# Patient Record
Sex: MALE | Race: WHITE | NOT HISPANIC OR LATINO
[De-identification: names, ages, dates, MRNs, and addresses within clinical notes are randomized per-mention and may not be internally consistent; named-entity substitution may affect disease eponyms.]

---

## 2019-06-18 ENCOUNTER — APPOINTMENT (OUTPATIENT)
Dept: ORTHOPEDIC SURGERY | Facility: CLINIC | Age: 54
End: 2019-06-18
Payer: MEDICARE

## 2019-06-18 VITALS — SYSTOLIC BLOOD PRESSURE: 130 MMHG | DIASTOLIC BLOOD PRESSURE: 78 MMHG | HEART RATE: 97 BPM

## 2019-06-18 DIAGNOSIS — Z60.2 PROBLEMS RELATED TO LIVING ALONE: ICD-10-CM

## 2019-06-18 DIAGNOSIS — Z78.9 OTHER SPECIFIED HEALTH STATUS: ICD-10-CM

## 2019-06-18 PROCEDURE — 99203 OFFICE O/P NEW LOW 30 MIN: CPT

## 2019-06-18 PROCEDURE — 73030 X-RAY EXAM OF SHOULDER: CPT | Mod: LT

## 2019-06-18 SDOH — SOCIAL STABILITY - SOCIAL INSECURITY: PROBLEMS RELATED TO LIVING ALONE: Z60.2

## 2019-08-23 ENCOUNTER — APPOINTMENT (OUTPATIENT)
Dept: ORTHOPEDIC SURGERY | Facility: CLINIC | Age: 54
End: 2019-08-23
Payer: MEDICARE

## 2019-08-23 VITALS — HEART RATE: 76 BPM | DIASTOLIC BLOOD PRESSURE: 75 MMHG | SYSTOLIC BLOOD PRESSURE: 114 MMHG

## 2019-08-23 PROCEDURE — 20610 DRAIN/INJ JOINT/BURSA W/O US: CPT | Mod: LT

## 2019-08-23 PROCEDURE — 99214 OFFICE O/P EST MOD 30 MIN: CPT | Mod: 25

## 2019-09-03 ENCOUNTER — APPOINTMENT (OUTPATIENT)
Dept: ORTHOPEDIC SURGERY | Facility: CLINIC | Age: 54
End: 2019-09-03
Payer: MEDICARE

## 2019-09-03 VITALS
DIASTOLIC BLOOD PRESSURE: 80 MMHG | HEIGHT: 73 IN | BODY MASS INDEX: 25.1 KG/M2 | WEIGHT: 189.4 LBS | HEART RATE: 69 BPM | SYSTOLIC BLOOD PRESSURE: 116 MMHG

## 2019-09-03 DIAGNOSIS — R29.898 OTHER SYMPTOMS AND SIGNS INVOLVING THE MUSCULOSKELETAL SYSTEM: ICD-10-CM

## 2019-09-03 PROCEDURE — 99214 OFFICE O/P EST MOD 30 MIN: CPT

## 2019-10-18 ENCOUNTER — APPOINTMENT (OUTPATIENT)
Dept: ORTHOPEDIC SURGERY | Facility: CLINIC | Age: 54
End: 2019-10-18
Payer: MEDICARE

## 2019-10-18 VITALS
HEART RATE: 91 BPM | DIASTOLIC BLOOD PRESSURE: 79 MMHG | HEIGHT: 73 IN | SYSTOLIC BLOOD PRESSURE: 116 MMHG | WEIGHT: 189 LBS | BODY MASS INDEX: 25.05 KG/M2

## 2019-10-18 PROCEDURE — 99213 OFFICE O/P EST LOW 20 MIN: CPT

## 2019-11-13 ENCOUNTER — OUTPATIENT (OUTPATIENT)
Dept: OUTPATIENT SERVICES | Facility: HOSPITAL | Age: 54
LOS: 1 days | End: 2019-11-13
Payer: MEDICARE

## 2019-11-13 VITALS
HEIGHT: 73 IN | RESPIRATION RATE: 14 BRPM | HEART RATE: 92 BPM | WEIGHT: 190.04 LBS | DIASTOLIC BLOOD PRESSURE: 92 MMHG | OXYGEN SATURATION: 97 % | SYSTOLIC BLOOD PRESSURE: 123 MMHG | TEMPERATURE: 98 F

## 2019-11-13 DIAGNOSIS — Z98.890 OTHER SPECIFIED POSTPROCEDURAL STATES: Chronic | ICD-10-CM

## 2019-11-13 DIAGNOSIS — Z01.818 ENCOUNTER FOR OTHER PREPROCEDURAL EXAMINATION: ICD-10-CM

## 2019-11-13 DIAGNOSIS — Z90.89 ACQUIRED ABSENCE OF OTHER ORGANS: Chronic | ICD-10-CM

## 2019-11-13 DIAGNOSIS — S43.432A SUPERIOR GLENOID LABRUM LESION OF LEFT SHOULDER, INITIAL ENCOUNTER: ICD-10-CM

## 2019-11-13 DIAGNOSIS — M67.912 UNSPECIFIED DISORDER OF SYNOVIUM AND TENDON, LEFT SHOULDER: ICD-10-CM

## 2019-11-13 DIAGNOSIS — Z98.1 ARTHRODESIS STATUS: Chronic | ICD-10-CM

## 2019-11-13 LAB
ANION GAP SERPL CALC-SCNC: 7 MMOL/L — SIGNIFICANT CHANGE UP (ref 5–17)
APTT BLD: 28.9 SEC — SIGNIFICANT CHANGE UP (ref 27.5–36.3)
BASOPHILS # BLD AUTO: 0.03 K/UL — SIGNIFICANT CHANGE UP (ref 0–0.2)
BASOPHILS NFR BLD AUTO: 0.5 % — SIGNIFICANT CHANGE UP (ref 0–2)
BUN SERPL-MCNC: 31 MG/DL — HIGH (ref 7–23)
CALCIUM SERPL-MCNC: 9.5 MG/DL — SIGNIFICANT CHANGE UP (ref 8.5–10.1)
CHLORIDE SERPL-SCNC: 104 MMOL/L — SIGNIFICANT CHANGE UP (ref 96–108)
CO2 SERPL-SCNC: 27 MMOL/L — SIGNIFICANT CHANGE UP (ref 22–31)
CREAT SERPL-MCNC: 0.75 MG/DL — SIGNIFICANT CHANGE UP (ref 0.5–1.3)
EOSINOPHIL # BLD AUTO: 0.07 K/UL — SIGNIFICANT CHANGE UP (ref 0–0.5)
EOSINOPHIL NFR BLD AUTO: 1.1 % — SIGNIFICANT CHANGE UP (ref 0–6)
GLUCOSE SERPL-MCNC: 105 MG/DL — HIGH (ref 70–99)
HCT VFR BLD CALC: 47.5 % — SIGNIFICANT CHANGE UP (ref 39–50)
HGB BLD-MCNC: 15.9 G/DL — SIGNIFICANT CHANGE UP (ref 13–17)
IMM GRANULOCYTES NFR BLD AUTO: 0.3 % — SIGNIFICANT CHANGE UP (ref 0–1.5)
INR BLD: 1.08 RATIO — SIGNIFICANT CHANGE UP (ref 0.88–1.16)
LYMPHOCYTES # BLD AUTO: 1.24 K/UL — SIGNIFICANT CHANGE UP (ref 1–3.3)
LYMPHOCYTES # BLD AUTO: 19.6 % — SIGNIFICANT CHANGE UP (ref 13–44)
MCHC RBC-ENTMCNC: 29.6 PG — SIGNIFICANT CHANGE UP (ref 27–34)
MCHC RBC-ENTMCNC: 33.5 GM/DL — SIGNIFICANT CHANGE UP (ref 32–36)
MCV RBC AUTO: 88.5 FL — SIGNIFICANT CHANGE UP (ref 80–100)
MONOCYTES # BLD AUTO: 0.56 K/UL — SIGNIFICANT CHANGE UP (ref 0–0.9)
MONOCYTES NFR BLD AUTO: 8.8 % — SIGNIFICANT CHANGE UP (ref 2–14)
NEUTROPHILS # BLD AUTO: 4.42 K/UL — SIGNIFICANT CHANGE UP (ref 1.8–7.4)
NEUTROPHILS NFR BLD AUTO: 69.7 % — SIGNIFICANT CHANGE UP (ref 43–77)
PLATELET # BLD AUTO: 222 K/UL — SIGNIFICANT CHANGE UP (ref 150–400)
POTASSIUM SERPL-MCNC: 4.3 MMOL/L — SIGNIFICANT CHANGE UP (ref 3.5–5.3)
POTASSIUM SERPL-SCNC: 4.3 MMOL/L — SIGNIFICANT CHANGE UP (ref 3.5–5.3)
PROTHROM AB SERPL-ACNC: 12 SEC — SIGNIFICANT CHANGE UP (ref 10–12.9)
RBC # BLD: 5.37 M/UL — SIGNIFICANT CHANGE UP (ref 4.2–5.8)
RBC # FLD: 13.2 % — SIGNIFICANT CHANGE UP (ref 10.3–14.5)
SODIUM SERPL-SCNC: 138 MMOL/L — SIGNIFICANT CHANGE UP (ref 135–145)
WBC # BLD: 6.34 K/UL — SIGNIFICANT CHANGE UP (ref 3.8–10.5)
WBC # FLD AUTO: 6.34 K/UL — SIGNIFICANT CHANGE UP (ref 3.8–10.5)

## 2019-11-13 PROCEDURE — 36415 COLL VENOUS BLD VENIPUNCTURE: CPT

## 2019-11-13 PROCEDURE — 85610 PROTHROMBIN TIME: CPT

## 2019-11-13 PROCEDURE — 80048 BASIC METABOLIC PNL TOTAL CA: CPT

## 2019-11-13 PROCEDURE — 85025 COMPLETE CBC W/AUTO DIFF WBC: CPT

## 2019-11-13 PROCEDURE — 93005 ELECTROCARDIOGRAM TRACING: CPT

## 2019-11-13 PROCEDURE — 85730 THROMBOPLASTIN TIME PARTIAL: CPT

## 2019-11-13 PROCEDURE — G0463: CPT | Mod: 25

## 2019-11-13 PROCEDURE — 93010 ELECTROCARDIOGRAM REPORT: CPT
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
## 2019-11-13 NOTE — H&P PST ADULT - NSANTHOSAYNRD_GEN_A_CORE
No. GIO screening performed.  STOP BANG Legend: 0-2 = LOW Risk; 3-4 = INTERMEDIATE Risk; 5-8 = HIGH Risk

## 2019-11-13 NOTE — H&P PST ADULT - ASSESSMENT
yo scheduled for   with      1. Labs as per surgeon  2. EKG  3. Medical evaluation with  4. discussed EZ sponges, NPO after MN & PST day of procedure instructions  5. Instructed to increase po fluids the day before surgery. Instructed to hold aspirin, NSAIDs, fish oil, vitamins & herbal supplements 7 days prior to surgery  6. CXR 55 yo male scheduled for left shoulder arthroscopy, lysis of adhesions, biceps tenodesis & related procedures on 11/20/19   with Dr. Rodríguez    1. CBC w diff, BMP, PTT, PT/INR  2. EKG  3. Medical evaluation with PCP Dr Faisal Mantilla as per NP in PST d/t abnormal EKG & h/o spinal cord injury. Pt instructed to make appt. Dr Rodríguez's office will be notified of need for clearance.   4. discussed EZ sponges, NPO after MN & PST day of procedure instructions  5. Instructed to increase po fluids the day before surgery. Instructed to hold aspirin, NSAIDs, fish oil, vitamins & herbal supplements 7 days prior to surgery
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
## 2019-11-14 DIAGNOSIS — M67.912 UNSPECIFIED DISORDER OF SYNOVIUM AND TENDON, LEFT SHOULDER: ICD-10-CM

## 2019-11-14 DIAGNOSIS — Z01.818 ENCOUNTER FOR OTHER PREPROCEDURAL EXAMINATION: ICD-10-CM

## 2019-11-14 DIAGNOSIS — S43.432A SUPERIOR GLENOID LABRUM LESION OF LEFT SHOULDER, INITIAL ENCOUNTER: ICD-10-CM

## 2019-11-18 ENCOUNTER — RX RENEWAL (OUTPATIENT)
Age: 54
End: 2019-11-18

## 2019-11-18 RX ORDER — ONDANSETRON 4 MG/1
4 TABLET ORAL
Qty: 42 | Refills: 0 | Status: COMPLETED | COMMUNITY
Start: 2019-11-18 | End: 2019-11-25

## 2019-11-18 RX ORDER — OXYCODONE 5 MG/1
5 TABLET ORAL
Qty: 30 | Refills: 0 | Status: DISCONTINUED | COMMUNITY
Start: 2019-11-18 | End: 2019-11-18

## 2019-11-19 RX ORDER — FENTANYL CITRATE 50 UG/ML
50 INJECTION INTRAVENOUS
Refills: 0 | Status: DISCONTINUED | OUTPATIENT
Start: 2019-11-20 | End: 2019-11-20

## 2019-11-19 RX ORDER — SODIUM CHLORIDE 9 MG/ML
1000 INJECTION, SOLUTION INTRAVENOUS
Refills: 0 | Status: DISCONTINUED | OUTPATIENT
Start: 2019-11-20 | End: 2019-11-20

## 2019-11-19 RX ORDER — OXYCODONE HYDROCHLORIDE 5 MG/1
10 TABLET ORAL ONCE
Refills: 0 | Status: DISCONTINUED | OUTPATIENT
Start: 2019-11-20 | End: 2019-11-20

## 2019-11-19 RX ORDER — ONDANSETRON 8 MG/1
4 TABLET, FILM COATED ORAL ONCE
Refills: 0 | Status: DISCONTINUED | OUTPATIENT
Start: 2019-11-20 | End: 2019-11-20

## 2019-11-19 NOTE — H&P ADULT - NSICDXPASTMEDICALHX_GEN_ALL_CORE_FT
PAST MEDICAL HISTORY:  Brown-Sequard syndrome     Impaired gait     Left shoulder pain     Paresthesia right side of body    Tear of right rotator cuff left

## 2019-11-19 NOTE — H&P ADULT - PROBLEM SELECTOR PLAN 1
Left shoulder arthroscopy with ZACKARY, HERLINDA< extensive debridement, SAD and acromioplasty, possible rotator cuff repair

## 2019-11-19 NOTE — H&P ADULT - HISTORY OF PRESENT ILLNESS
Mr. BONDS is a pleasant 54 year old male who presents with Left shoulder pain. He still complains of decreased range of motion and pain in his shoulder. He has difficulty using his arm when in the wheelchair. Overall he is very unhappy with the way shoulder feels.       ANI BONDS presents with pain of the left shoulder. He states the symptoms are stable. Pain levels include a current pain level of 0/10, an average pain level of 0/10, a minimum pain level of 0/10 and a maximum pain level of 0/10.   He states the symptoms seem to be intermittent.     Modifying factors - worsened by lifting.

## 2019-11-19 NOTE — H&P ADULT - NSHPPHYSICALEXAM_GEN_ALL_CORE
Physical Exam:  General: Well appearing, no acute distress, A&O  Neurologic: A&Ox3, No focal deficits  Head: NCAT without abrasions, lacerations, or ecchymosis to head, face, or scalp  Eyes: No scleral icterus, no gross abnormalities  Respiratory: Equal chest wall expansion bilaterally, no accessory muscle use  Lymphatic: No lymphadenopathy palpated  Skin: Warm and dry  Psychiatric: Normal mood and affect  Left Shoulder  ·	Inspection/Palpation: no tenderness, swelling or deformities  ·	Range of Motion: no crepitus with ROM; Active ; ER at side 10; IR to belt; Passive ; ER at side 15; IR to belt  ·	Strength: forward elevation in scapular plane [4/5], internal rotation [4/5], external rotation [4/5], adduction [4/5] and abduction [4/5]  ·	Stability: no joint instability on provocative testing  ·	Tests: Mendez test positive, Neer positive, positive drop arm test secondary to pain, bear hug test positive, Napolean sign negative, cross arm adduction negative, lift off sign positive, hornblowers sign negative, speeds test negative, Yergason's test negative, no bicipital groove tenderness, Diaz's Active Compression test negative, whipple test negative, bicep's load II test negative    Right Shoulder  ·	Inspection/Palpation: no tenderness, swelling or deformities  ·	Range of Motion: full and painless in all planes, no crepitus  ·	Strength: forward elevation in scapular plane 5/5, internal rotation 5/5, external rotation 5/5, adduction 5/5 and abduction 5/5  ·	Stability: no joint instability on provocative testing  ·	Tests: Mendez test negative, Neer sign negative, negative drop arm test secondary to pain, bear hug test negative, Napolean sign negative, cross arm adduction negative, lift off sign positive, hornblowers sign negative, speeds test negative, Yergason's test negative, no bicipital groove tenderness, Diaz's Active Compression test negative

## 2019-11-19 NOTE — H&P ADULT - ASSESSMENT
Bong is a 54-year-old male comes in for followup of his left shoulder pain. He saw significant restricted range of motion and difficulty using his arm. Then a thorough discussion with him regarding operative and nonoperative management. Given that he has failed all conservative measures he elects for operative intervention. He defers all other conservative management. He would like to move forward with arthroscopic surgery of his left shoulder.    All risks, benefits and alternatives to the proposed surgical procedure, left shoulder arthroscopy with lysis of adhesions, manipulation under anesthesia, extensive debridement, rotator cuff debridement, biceps tenodesis versus tenotomy, subacromial decompression with acromioplasty, possible distal clavicle excision, were discussed in great detail with the patient. Risks include but are not limited to pain, bleeding, infection, stiffness, , medical complications (including DVT, PE, MI), and risks of anesthesia. The patient expressed understanding and all questions were answered. The patient is electing to proceed, and will have the patient scheduled accordingly.

## 2019-11-20 ENCOUNTER — INPATIENT (INPATIENT)
Facility: HOSPITAL | Age: 54
LOS: 4 days | Discharge: HOME | DRG: 501 | End: 2019-11-25
Attending: ORTHOPAEDIC SURGERY | Admitting: ORTHOPAEDIC SURGERY
Payer: MEDICARE

## 2019-11-20 ENCOUNTER — APPOINTMENT (OUTPATIENT)
Dept: ORTHOPEDIC SURGERY | Facility: HOSPITAL | Age: 54
End: 2019-11-20

## 2019-11-20 VITALS
HEIGHT: 73 IN | TEMPERATURE: 98 F | OXYGEN SATURATION: 96 % | HEART RATE: 84 BPM | SYSTOLIC BLOOD PRESSURE: 130 MMHG | WEIGHT: 190.04 LBS | RESPIRATION RATE: 16 BRPM | DIASTOLIC BLOOD PRESSURE: 84 MMHG

## 2019-11-20 DIAGNOSIS — M75.02 ADHESIVE CAPSULITIS OF LEFT SHOULDER: ICD-10-CM

## 2019-11-20 DIAGNOSIS — S43.432A SUPERIOR GLENOID LABRUM LESION OF LEFT SHOULDER, INITIAL ENCOUNTER: ICD-10-CM

## 2019-11-20 DIAGNOSIS — Z98.890 OTHER SPECIFIED POSTPROCEDURAL STATES: Chronic | ICD-10-CM

## 2019-11-20 DIAGNOSIS — M67.912 UNSPECIFIED DISORDER OF SYNOVIUM AND TENDON, LEFT SHOULDER: ICD-10-CM

## 2019-11-20 DIAGNOSIS — Z98.1 ARTHRODESIS STATUS: Chronic | ICD-10-CM

## 2019-11-20 DIAGNOSIS — Z90.89 ACQUIRED ABSENCE OF OTHER ORGANS: Chronic | ICD-10-CM

## 2019-11-20 RX ORDER — OXYCODONE HYDROCHLORIDE 5 MG/1
10 TABLET ORAL EVERY 4 HOURS
Refills: 0 | Status: DISCONTINUED | OUTPATIENT
Start: 2019-11-20 | End: 2019-11-25

## 2019-11-20 RX ORDER — HYDROMORPHONE HYDROCHLORIDE 2 MG/ML
0.5 INJECTION INTRAMUSCULAR; INTRAVENOUS; SUBCUTANEOUS
Refills: 0 | Status: DISCONTINUED | OUTPATIENT
Start: 2019-11-20 | End: 2019-11-22

## 2019-11-20 RX ORDER — DIAZEPAM 5 MG
10 TABLET ORAL EVERY 8 HOURS
Refills: 0 | Status: DISCONTINUED | OUTPATIENT
Start: 2019-11-20 | End: 2019-11-25

## 2019-11-20 RX ORDER — ZOLPIDEM TARTRATE 10 MG/1
5 TABLET ORAL AT BEDTIME
Refills: 0 | Status: DISCONTINUED | OUTPATIENT
Start: 2019-11-20 | End: 2019-11-25

## 2019-11-20 RX ORDER — ZOLPIDEM TARTRATE 10 MG/1
1 TABLET ORAL
Qty: 0 | Refills: 0 | DISCHARGE

## 2019-11-20 RX ORDER — DIAZEPAM 5 MG
1 TABLET ORAL
Qty: 0 | Refills: 0 | DISCHARGE

## 2019-11-20 RX ORDER — CEFAZOLIN SODIUM 1 G
2000 VIAL (EA) INJECTION EVERY 8 HOURS
Refills: 0 | Status: COMPLETED | OUTPATIENT
Start: 2019-11-20 | End: 2019-11-21

## 2019-11-20 RX ORDER — BACLOFEN 100 %
20 POWDER (GRAM) MISCELLANEOUS THREE TIMES A DAY
Refills: 0 | Status: DISCONTINUED | OUTPATIENT
Start: 2019-11-20 | End: 2019-11-25

## 2019-11-20 RX ORDER — BACLOFEN 100 %
1 POWDER (GRAM) MISCELLANEOUS
Qty: 0 | Refills: 0 | DISCHARGE

## 2019-11-20 RX ORDER — OXYCODONE HYDROCHLORIDE 5 MG/1
5 TABLET ORAL EVERY 4 HOURS
Refills: 0 | Status: DISCONTINUED | OUTPATIENT
Start: 2019-11-20 | End: 2019-11-25

## 2019-11-20 RX ORDER — IBUPROFEN 200 MG
1 TABLET ORAL
Qty: 0 | Refills: 0 | DISCHARGE

## 2019-11-20 RX ADMIN — OXYCODONE HYDROCHLORIDE 10 MILLIGRAM(S): 5 TABLET ORAL at 17:33

## 2019-11-20 RX ADMIN — OXYCODONE HYDROCHLORIDE 10 MILLIGRAM(S): 5 TABLET ORAL at 22:10

## 2019-11-20 RX ADMIN — Medication 100 MILLIGRAM(S): at 23:18

## 2019-11-20 RX ADMIN — OXYCODONE HYDROCHLORIDE 10 MILLIGRAM(S): 5 TABLET ORAL at 21:40

## 2019-11-20 NOTE — ASU PATIENT PROFILE, ADULT - PMH
Brown-Sequard syndrome    Impaired gait    Left shoulder pain    Paresthesia  right side of body  Tear of right rotator cuff  left

## 2019-11-20 NOTE — PROGRESS NOTE ADULT - SUBJECTIVE AND OBJECTIVE BOX
Postop Check    Patient tolerated the procedure well. Patient seen and examined at bedside. No acute complaints at this time. Pain well controlled. Denies chest pain, shortness of breath, nausea or vomiting.     PE:  Vital Signs Last 24 Hrs  T(C): 36.3 (11-20-19 @ 22:21), Max: 36.9 (11-20-19 @ 11:22)  T(F): 97.4 (11-20-19 @ 22:21), Max: 98.5 (11-20-19 @ 11:22)  HR: 70 (11-20-19 @ 22:21) (68 - 95)  BP: 119/74 (11-20-19 @ 22:21) (106/84 - 150/83)  BP(mean): --  RR: 16 (11-20-19 @ 22:21) (12 - 17)  SpO2: 96% (11-20-19 @ 22:21) (93% - 100%)    General: NAD, resting comfortably in bed  LUE:   Dressing C/D/I in shoulder immoblizer  Compartments soft and compressible  No calf tenderness bilaterally  +Axillary/Musculocutaneous/Radial/Median/AIN/PIN intact  SILT C5-T1  2+ radial pulses                A/P:  54y m s/p L shoulder scope POD 0  -PT/OT -NWB LUE  -Pain Control  -DVT ppx w/ SCDs  -Continue perioperative abx x 24 hours  -FU AM Labs  -Rest, ice, and compress the extremity as we needed  -Incentive Spirometry  -Medical management appreciated

## 2019-11-20 NOTE — BRIEF OPERATIVE NOTE - NSICDXBRIEFPROCEDURE_GEN_ALL_CORE_FT
PROCEDURES:  Manipulation of shoulder joint under anesthesia 20-Nov-2019 13:53:10  Daryl Rodríguez  Arthroscopic debridement, shoulder, extensive 20-Nov-2019 13:53:03  Daryl Rodríguez  Lysis of adhesions of shoulder joint 20-Nov-2019 13:52:57  Daryl Rodríguez

## 2019-11-20 NOTE — ASU DISCHARGE PLAN (ADULT/PEDIATRIC) - CARE PROVIDER_API CALL
Daryl Rodríguez (DO)  [facility]  [address]  [place] [address]  Phone: [phone]  Fax: [phone]  Follow Up Time:
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
## 2019-11-21 PROCEDURE — 97530 THERAPEUTIC ACTIVITIES: CPT | Mod: GP

## 2019-11-21 PROCEDURE — 97116 GAIT TRAINING THERAPY: CPT | Mod: GP

## 2019-11-21 RX ORDER — TAMSULOSIN HYDROCHLORIDE 0.4 MG/1
0.4 CAPSULE ORAL ONCE
Refills: 0 | Status: COMPLETED | OUTPATIENT
Start: 2019-11-21 | End: 2019-11-21

## 2019-11-21 RX ORDER — TAMSULOSIN HYDROCHLORIDE 0.4 MG/1
0.4 CAPSULE ORAL AT BEDTIME
Refills: 0 | Status: DISCONTINUED | OUTPATIENT
Start: 2019-11-22 | End: 2019-11-25

## 2019-11-21 RX ORDER — TAMSULOSIN HYDROCHLORIDE 0.4 MG/1
0.4 CAPSULE ORAL AT BEDTIME
Refills: 0 | Status: DISCONTINUED | OUTPATIENT
Start: 2019-11-21 | End: 2019-11-21

## 2019-11-21 RX ADMIN — TAMSULOSIN HYDROCHLORIDE 0.4 MILLIGRAM(S): 0.4 CAPSULE ORAL at 14:26

## 2019-11-21 RX ADMIN — OXYCODONE HYDROCHLORIDE 10 MILLIGRAM(S): 5 TABLET ORAL at 16:56

## 2019-11-21 RX ADMIN — HYDROMORPHONE HYDROCHLORIDE 0.5 MILLIGRAM(S): 2 INJECTION INTRAMUSCULAR; INTRAVENOUS; SUBCUTANEOUS at 02:41

## 2019-11-21 RX ADMIN — ZOLPIDEM TARTRATE 5 MILLIGRAM(S): 10 TABLET ORAL at 00:30

## 2019-11-21 RX ADMIN — OXYCODONE HYDROCHLORIDE 10 MILLIGRAM(S): 5 TABLET ORAL at 17:26

## 2019-11-21 RX ADMIN — Medication 100 MILLIGRAM(S): at 05:40

## 2019-11-21 RX ADMIN — HYDROMORPHONE HYDROCHLORIDE 0.5 MILLIGRAM(S): 2 INJECTION INTRAMUSCULAR; INTRAVENOUS; SUBCUTANEOUS at 21:04

## 2019-11-21 RX ADMIN — HYDROMORPHONE HYDROCHLORIDE 0.5 MILLIGRAM(S): 2 INJECTION INTRAMUSCULAR; INTRAVENOUS; SUBCUTANEOUS at 21:19

## 2019-11-21 RX ADMIN — HYDROMORPHONE HYDROCHLORIDE 0.5 MILLIGRAM(S): 2 INJECTION INTRAMUSCULAR; INTRAVENOUS; SUBCUTANEOUS at 02:26

## 2019-11-21 RX ADMIN — OXYCODONE HYDROCHLORIDE 10 MILLIGRAM(S): 5 TABLET ORAL at 10:19

## 2019-11-21 RX ADMIN — Medication 10 MILLIGRAM(S): at 00:30

## 2019-11-21 RX ADMIN — OXYCODONE HYDROCHLORIDE 10 MILLIGRAM(S): 5 TABLET ORAL at 10:49

## 2019-11-21 NOTE — PHYSICAL THERAPY INITIAL EVALUATION ADULT - MODALITIES TREATMENT COMMENTS
chari tx fair, ambulation limited by pain in L shoulder/ left in chair, all needs in reach, Patient would benefit from PT to increase functional mobility

## 2019-11-21 NOTE — PHYSICAL THERAPY INITIAL EVALUATION ADULT - HEALTH SCREEN CRITERIA
Physical Therapy Daily Treatment    Visit Count: 7      Plan of Care: 12/7/2018 Through: 3/1/2019  Insurance Information: physical and speech therapy combined cap of $2010 per calendar year  Referred by: MANGO Wagner; Next provider visit (if known/scheduled): none  Medical Diagnosis (from order):  Chronic right shoulder pain [M25.511, G89.29]  Treatment Diagnosis: Right shoulder symptoms with increased pain/symptoms, impaired posture, impaired strength, impaired range of motion, impaired scapulohumeral rhythm, impaired muscle length/flexibility, impaired mobility     Date of onset/injury: 1 month ago  Diagnosis Precautions: none  Chart reviewed at time of initial evaluation (relevant co-morbidities, allergies, tests and medications listed): intermittent cervical pain  --- right sided pain with sleeping and waking first thing in the morning.  Previously diagnosed with carpal tunnel.    SUBJECTIVE   Patient reports reaching overhead is getting easier. Continued pain with reaching behind the back and lifting heavy objects.    Current pain: 1-2/10.     OBJECTIVE   Biceps less active with active range of motion shoulder motions.    Active shoulder flexion: 150* degrees   Active  shoulder abduction: 140* degrees   Functional Internal rotation: L2*  90/90 external rotation: 75*    Treatment   Manual Therapy:  glenohumeral joint distraction mobilization grade IV, 3 x 10  Supine soft tissue mobilization to right mid-distal biceps muscle and tendon. soft tissue mobilization to right infraspinatus / teres minor/ posterior deltoid and clearing around radial nerve. soft tissue mobilization to right subscapularis    Neuromuscular Reeducation:  Supine active assisted glenohumeral flexion and abduction with gentle inferior glide of humeral head by therapist x 20 each  Side lying right shoulder scaption active range of motion x 10 with gentle inferior glide of humeral head  Side lying right shoulder external rotation  active range of motion  x 10  4 point: alternating shoulder extension x 10, flexion x 10, horizontal abduction with external rotation x 10  Wall wash bilateral shoulder flexion x 10  Wall wash right shoulder scaption x 10  Standing bilateral shoulder flexion to 90 degrees with 2 pound weights x 10  Standing bilateral shoulder scaption to 90 degrees with 2 pound weights x 10  Standing right shoulder function internal rotation with assist to decrease anterior translation of humeral head  Standing right shoulder external rotation in supported 90/90 position x 10  Bike 20 westfall x 4 minutes alternating forward and retro with towel roll for support.    Home Program:   Scapular retraction  Thoracic self-mobilization  Supine pec stretch  Supine right shoulder flexion with focus on turning off biceps     Writer verbally educated the patient and received verbal consent from the patient on hand placement, positioning of patient, and techniques to be performed today and how they are pertinent to the patient's plan of care.      Suggestions for next session as indicated: progress per plan of care, soft tissue mobilization as needed, passive capsular stretching, continue to progress active assisted and active range of motion activities. continue rotator cuff strengthening -- continue with arm bike next session and add rotator cuff strengthening.    ASSESSMENT   Patient demonstrates improving right shoulder active range of motion with correct glenohumeral joint arthrokinematic's. Continued capsular restrictions. Irritability: moderate.     Result of above outlined education: Verbalizes understanding and Demonstrates understanding    THERAPY DAILY BILLING   Insurance: [employer].     Evaluation Procedures:  No evaluation codes were used on this date of service    Timed Procedures:  Manual Therapy, 13 minutes  Neuromuscular Re-Education, 30 minutes    Untimed Procedures:  No untimed codes were used on  yes this date of service    Total Treatment Time: 43 minutes

## 2019-11-21 NOTE — PROGRESS NOTE ADULT - SUBJECTIVE AND OBJECTIVE BOX
Patient seen and examined at bedside. No acute events over night. No acute complaints at this time. Pain well controlled. Denies chest pain, shortness of breath, nausea or vomiting. Difficulty sleeping due to discomfort but overall his pain is well controlled    PE:  Vital Signs Last 24 Hrs  T(C): 36.6 (11-21-19 @ 03:30), Max: 36.9 (11-20-19 @ 11:22)  T(F): 97.8 (11-21-19 @ 03:30), Max: 98.5 (11-20-19 @ 11:22)  HR: 76 (11-21-19 @ 03:30) (68 - 95)  BP: 121/70 (11-21-19 @ 03:30) (106/84 - 150/83)  BP(mean): --  RR: 16 (11-21-19 @ 03:30) (12 - 17)  SpO2: 95% (11-21-19 @ 03:30) (93% - 100%)    General: NAD, resting comforatbly in bed  LUE:   Dressing C/D/I  Compartments soft and compressible  No calf tenderness bilaterally  +Axillary/Musculocutaneous/Radial/Median/AIN/PIN intact  SILT C5-T1  2+ radial pulses                A/P:  54y m s/p L shoulder arthtroscopy POD 1  -PT/OT - NWB LUE  -Pain Control  -DVT ppx *w/ scds  -Rest, ice, compress and elevate the extremity as we needed  -Incentive Spirometry  -Medical management appreciated  -Dispo pending. Patient seen and examined at bedside. No acute events over night. No acute complaints at this time. Pain well controlled. Denies chest pain, shortness of breath, nausea or vomiting.     PE:  Vital Signs Last 24 Hrs  T(C): 36.6 (11-21-19 @ 03:30), Max: 36.9 (11-20-19 @ 11:22)  T(F): 97.8 (11-21-19 @ 03:30), Max: 98.5 (11-20-19 @ 11:22)  HR: 76 (11-21-19 @ 03:30) (68 - 95)  BP: 121/70 (11-21-19 @ 03:30) (106/84 - 150/83)  BP(mean): --  RR: 16 (11-21-19 @ 03:30) (12 - 17)  SpO2: 95% (11-21-19 @ 03:30) (93% - 100%)    General: NAD, resting comforatbly in bed  LUE:   Dressing C/D/I  Compartments soft and compressible  No calf tenderness bilaterally  +Axillary/Musculocutaneous/Radial/Median/AIN/PIN intact  SILT C5-T1  2+ radial pulses                A/P:  54y m s/p L shoulder arthtroscopy POD 1  -PT/OT - NWB LUE  -Pain Control  -DVT ppx *w/ scds  -Rest, ice, compress and elevate the extremity as we needed  -Incentive Spirometry  -Medical management appreciated  -Dispo pending. Patient is a minimal ambulator with use of a walker as well as a wheelchair.  He uses his shoulders for significant weightbearing surfaces.  Will discuss with  and social work regarding possible prolonged hospital stay versus home health care.

## 2019-11-21 NOTE — PHYSICAL THERAPY INITIAL EVALUATION ADULT - GENERAL OBSERVATIONS, REHAB EVAL
Pt recd supine in bed NAD, agreeable and pleasant for PT. L shoulder Dressing C/D/I in shoulder immoblizer

## 2019-11-21 NOTE — PROGRESS NOTE ADULT - SUBJECTIVE AND OBJECTIVE BOX
Orthopedics Consult Note:    This is a 54y Male s/p left shoulder scope POD1. Pt reports he doesn't have any help at home and is paraplegic. Pt reports he does not have normal sensation in his right side and unable to move LUE well at baseline. Pt is minimally ambulatory, transfers only at home with assistance. Pt reports pain is controlled. Pt denies any new numbness, tingling or parethesias.    Pt with urinary retention s/p straight cath x 2, most recently ~10AM.    Pt evaluated, able to transfer with assist; not safe for discharge home today.    Past Medical & Surgical History:  Superior glenoid labrum lesion of left shoulder, initial encounter  Disorder of synovium or tendon of left shoulder  FH: heart disease  FH: stroke  Handoff  MEWS Score  Paresthesia  Impaired gait  Tear of right rotator cuff  Left shoulder pain  Brown-Sequard syndrome  Adhesive capsulitis of left shoulder  Adhesive capsulitis of left shoulder  Adhesive capsulitis of left shoulder  Manipulation of shoulder joint under anesthesia  Arthroscopic debridement, shoulder, extensive  Lysis of adhesions of shoulder joint  History of lumbar laminectomy  H/O colonoscopy  History of shoulder surgery  History of tonsillectomy  S/P cervical spinal fusion    Allergies:  No Known Allergies    Vital Signs:  T(C): 36.4 (11-21-19 @ 11:05), Max: 36.6 (11-21-19 @ 03:30)  HR: 81 (11-21-19 @ 11:05) (68 - 95)  BP: 120/66 (11-21-19 @ 11:05) (106/84 - 150/83)  RR: 16 (11-21-19 @ 11:05) (12 - 17)  SpO2: 96% (11-21-19 @ 11:05) (93% - 100%)    Labs:  None      PE left shoulder:  dressing in place, clean, dry and intact. Shoulder immobilizer in place.   Unable to range fingers, able to wiggle fingers slightly (baseline per patient). Sensation intact. Radial pulse 2+.     Assessment:  54y Male s/p left shoulder arthroscopy yesterday, unsafe for discharge home today.    Plan:  Admit to orthopedics.  Due to void ~6pm.  NWB LUE with shoulder immobilizer.   Keep dressing clean, dry and intact.  Pain control.  Ice application.  f/u PT NWB LUE with shoulder immobilizer.    Case discussed with Dr. Rodríguez who agrees with plan.

## 2019-11-21 NOTE — PROGRESS NOTE ADULT - SUBJECTIVE AND OBJECTIVE BOX
Patient seen and examined at bedside. No acute events over night. No acute complaints at this time. Pain well controlled. Denies chest pain, shortness of breath, nausea or vomiting.     PE:  Vital Signs Last 24 Hrs  T(C): 36.6 (11-21-19 @ 03:30), Max: 36.9 (11-20-19 @ 11:22)  T(F): 97.8 (11-21-19 @ 03:30), Max: 98.5 (11-20-19 @ 11:22)  HR: 76 (11-21-19 @ 03:30) (68 - 95)  BP: 121/70 (11-21-19 @ 03:30) (106/84 - 150/83)  BP(mean): --  RR: 16 (11-21-19 @ 03:30) (12 - 17)  SpO2: 95% (11-21-19 @ 03:30) (93% - 100%)    General: NAD, resting comforatbly in bed  LUE:   Dressing C/D/I  Compartments soft and compressible  No calf tenderness bilaterally  +Axillary/Musculocutaneous/Radial/Median/AIN/PIN intact  SILT C5-T1  2+ radial pulses                A/P:  54y m s/p L shoulder arthtroscopy POD 1  -PT/OT - NWB LUE  -Pain Control  -DVT ppx *w/ scds  -Rest, ice, compress and elevate the extremity as we needed  -Incentive Spirometry  -Medical management appreciated  -Dispo pending

## 2019-11-22 RX ORDER — HYDROMORPHONE HYDROCHLORIDE 2 MG/ML
0.5 INJECTION INTRAMUSCULAR; INTRAVENOUS; SUBCUTANEOUS EVERY 4 HOURS
Refills: 0 | Status: DISCONTINUED | OUTPATIENT
Start: 2019-11-22 | End: 2019-11-25

## 2019-11-22 RX ORDER — MAGNESIUM HYDROXIDE 400 MG/1
30 TABLET, CHEWABLE ORAL EVERY 6 HOURS
Refills: 0 | Status: DISCONTINUED | OUTPATIENT
Start: 2019-11-22 | End: 2019-11-25

## 2019-11-22 RX ADMIN — Medication 20 MILLIGRAM(S): at 00:13

## 2019-11-22 RX ADMIN — Medication 10 MILLIGRAM(S): at 00:14

## 2019-11-22 RX ADMIN — OXYCODONE HYDROCHLORIDE 10 MILLIGRAM(S): 5 TABLET ORAL at 14:10

## 2019-11-22 RX ADMIN — ZOLPIDEM TARTRATE 5 MILLIGRAM(S): 10 TABLET ORAL at 00:14

## 2019-11-22 RX ADMIN — OXYCODONE HYDROCHLORIDE 10 MILLIGRAM(S): 5 TABLET ORAL at 15:00

## 2019-11-22 RX ADMIN — HYDROMORPHONE HYDROCHLORIDE 0.5 MILLIGRAM(S): 2 INJECTION INTRAMUSCULAR; INTRAVENOUS; SUBCUTANEOUS at 00:51

## 2019-11-22 RX ADMIN — HYDROMORPHONE HYDROCHLORIDE 0.5 MILLIGRAM(S): 2 INJECTION INTRAMUSCULAR; INTRAVENOUS; SUBCUTANEOUS at 01:06

## 2019-11-22 RX ADMIN — TAMSULOSIN HYDROCHLORIDE 0.4 MILLIGRAM(S): 0.4 CAPSULE ORAL at 23:37

## 2019-11-22 RX ADMIN — OXYCODONE HYDROCHLORIDE 10 MILLIGRAM(S): 5 TABLET ORAL at 09:50

## 2019-11-22 RX ADMIN — OXYCODONE HYDROCHLORIDE 10 MILLIGRAM(S): 5 TABLET ORAL at 10:35

## 2019-11-22 RX ADMIN — HYDROMORPHONE HYDROCHLORIDE 0.5 MILLIGRAM(S): 2 INJECTION INTRAMUSCULAR; INTRAVENOUS; SUBCUTANEOUS at 05:04

## 2019-11-22 RX ADMIN — HYDROMORPHONE HYDROCHLORIDE 0.5 MILLIGRAM(S): 2 INJECTION INTRAMUSCULAR; INTRAVENOUS; SUBCUTANEOUS at 05:19
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
## 2019-11-23 ENCOUNTER — TRANSCRIPTION ENCOUNTER (OUTPATIENT)
Age: 54
End: 2019-11-23

## 2019-11-23 RX ORDER — HEPARIN SODIUM 5000 [USP'U]/ML
5000 INJECTION INTRAVENOUS; SUBCUTANEOUS EVERY 12 HOURS
Refills: 0 | Status: DISCONTINUED | OUTPATIENT
Start: 2019-11-23 | End: 2019-11-23

## 2019-11-23 RX ORDER — ASPIRIN/CALCIUM CARB/MAGNESIUM 324 MG
325 TABLET ORAL DAILY
Refills: 0 | Status: DISCONTINUED | OUTPATIENT
Start: 2019-11-23 | End: 2019-11-25

## 2019-11-23 RX ORDER — ASPIRIN/CALCIUM CARB/MAGNESIUM 324 MG
1 TABLET ORAL
Qty: 0 | Refills: 0 | DISCHARGE
Start: 2019-11-23

## 2019-11-23 RX ADMIN — TAMSULOSIN HYDROCHLORIDE 0.4 MILLIGRAM(S): 0.4 CAPSULE ORAL at 20:46

## 2019-11-23 RX ADMIN — HYDROMORPHONE HYDROCHLORIDE 0.5 MILLIGRAM(S): 2 INJECTION INTRAMUSCULAR; INTRAVENOUS; SUBCUTANEOUS at 04:28

## 2019-11-23 RX ADMIN — HYDROMORPHONE HYDROCHLORIDE 0.5 MILLIGRAM(S): 2 INJECTION INTRAMUSCULAR; INTRAVENOUS; SUBCUTANEOUS at 04:42

## 2019-11-23 RX ADMIN — OXYCODONE HYDROCHLORIDE 10 MILLIGRAM(S): 5 TABLET ORAL at 13:17

## 2019-11-23 RX ADMIN — ZOLPIDEM TARTRATE 5 MILLIGRAM(S): 10 TABLET ORAL at 01:32

## 2019-11-23 RX ADMIN — Medication 10 MILLIGRAM(S): at 00:50

## 2019-11-23 RX ADMIN — MAGNESIUM HYDROXIDE 30 MILLILITER(S): 400 TABLET, CHEWABLE ORAL at 23:10

## 2019-11-23 RX ADMIN — OXYCODONE HYDROCHLORIDE 10 MILLIGRAM(S): 5 TABLET ORAL at 08:18

## 2019-11-23 RX ADMIN — Medication 20 MILLIGRAM(S): at 23:11

## 2019-11-23 RX ADMIN — ZOLPIDEM TARTRATE 5 MILLIGRAM(S): 10 TABLET ORAL at 01:38

## 2019-11-23 RX ADMIN — HYDROMORPHONE HYDROCHLORIDE 0.5 MILLIGRAM(S): 2 INJECTION INTRAMUSCULAR; INTRAVENOUS; SUBCUTANEOUS at 00:29

## 2019-11-23 RX ADMIN — OXYCODONE HYDROCHLORIDE 10 MILLIGRAM(S): 5 TABLET ORAL at 20:47

## 2019-11-23 RX ADMIN — OXYCODONE HYDROCHLORIDE 10 MILLIGRAM(S): 5 TABLET ORAL at 21:17

## 2019-11-23 RX ADMIN — ZOLPIDEM TARTRATE 5 MILLIGRAM(S): 10 TABLET ORAL at 23:10

## 2019-11-23 RX ADMIN — Medication 325 MILLIGRAM(S): at 13:16

## 2019-11-23 RX ADMIN — Medication 20 MILLIGRAM(S): at 01:32

## 2019-11-23 RX ADMIN — OXYCODONE HYDROCHLORIDE 10 MILLIGRAM(S): 5 TABLET ORAL at 08:48

## 2019-11-23 RX ADMIN — Medication 10 MILLIGRAM(S): at 22:04

## 2019-11-23 RX ADMIN — OXYCODONE HYDROCHLORIDE 10 MILLIGRAM(S): 5 TABLET ORAL at 13:47

## 2019-11-23 RX ADMIN — HYDROMORPHONE HYDROCHLORIDE 0.5 MILLIGRAM(S): 2 INJECTION INTRAMUSCULAR; INTRAVENOUS; SUBCUTANEOUS at 00:14

## 2019-11-23 RX ADMIN — MAGNESIUM HYDROXIDE 30 MILLILITER(S): 400 TABLET, CHEWABLE ORAL at 00:52

## 2019-11-23 NOTE — DISCHARGE NOTE PROVIDER - NSDCFUSCHEDAPPT_GEN_ALL_CORE_FT
ANI BONDS ; 12/05/2019 ; [facility] [address] [facility] ANI BONDS ; 12/05/2019 ; [facility] [address] [facility] ANI BONDS ; 12/05/2019 ; [facility] [address] [facility] ANI BONDS ; 12/03/2019 ; [facility] [address] [facility]

## 2019-11-23 NOTE — DISCHARGE NOTE PROVIDER - CARE PROVIDER_API CALL
Daryl Rodríguez (DO)  [facility][note 3]  [address]  [place] [address]  Phone: [phone]  Fax: [phone]  Follow Up Time: 2 weeks

## 2019-11-23 NOTE — DISCHARGE NOTE PROVIDER - NSDCFUADDINST_GEN_ALL_CORE_FT
· Bathing	Shower only  · Driving	No...  · Return to Work/School	No...  · Activity Level	No excercise  No heavy lifting  No sports/gym  Weight bearing as tolerated
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
## 2019-11-23 NOTE — DISCHARGE NOTE PROVIDER - NSDCCPCAREPLAN_GEN_ALL_CORE_FT
PRINCIPAL DISCHARGE DIAGNOSIS  Diagnosis: Adhesive capsulitis of left shoulder  Assessment and Plan of Treatment: Adhesive capsulitis of left shoulder

## 2019-11-23 NOTE — DISCHARGE NOTE PROVIDER - NSDCMRMEDTOKEN_GEN_ALL_CORE_FT
Ambien 10 mg oral tablet: 1 tab(s) orally once a day (at bedtime), As Needed  aspirin 325 mg oral tablet: 1 tab(s) orally once a day  baclofen 20 mg oral tablet: 1 tab(s) orally 3 times a day, As Needed  diazePAM 10 mg oral tablet: 1 tab(s) orally every 8 hours, As Needed  Norco 10 mg-325 mg oral tablet: 1 tab(s) orally every 6 hours Ambien 10 mg oral tablet: 1 tab(s) orally once a day (at bedtime), As Needed  aspirin 325 mg oral tablet: 1 tab(s) orally once a day  baclofen 20 mg oral tablet: 1 tab(s) orally 3 times a day, As Needed  diazePAM 10 mg oral tablet: 1 tab(s) orally every 8 hours, As Needed  Norco 10 mg-325 mg oral tablet: 1 tab(s) orally every 6 hours, As Needed -for severe pain MDD:4

## 2019-11-23 NOTE — PROGRESS NOTE ADULT - SUBJECTIVE AND OBJECTIVE BOX
Patient seen and examined at bedside. No acute events over night. Pt has required straight cath x4.  No acute complaints at this time. Pain well controlled. Denies chest pain, shortness of breath, nausea or vomiting.     PE:  Vital Signs Last 24 Hrs  T(C): 36.5 (11-23-19 @ 06:03), Max: 36.8 (11-22-19 @ 11:41)  T(F): 97.7 (11-23-19 @ 06:03), Max: 98.3 (11-22-19 @ 11:41)  HR: 79 (11-23-19 @ 06:03) (79 - 99)  BP: 117/63 (11-23-19 @ 06:03) (109/63 - 136/81)  BP(mean): --  RR: 18 (11-23-19 @ 06:03) (18 - 18)  SpO2: 97% (11-23-19 @ 06:03) (93% - 97%)    General: NAD, resting comforatbly in bed  LUE:   Dressing C/D/I  Compartments soft and compressible  No calf tenderness bilaterally  +Axillary/Musculocutaneous/Radial/Median/AIN/PIN intact  SILT C5-T1  2+ radial pulses                A/P:  54y m s/p L TSA  -PT/OT - NWB on the L shoulder  -Pain Control  -DVT ppx w/heparin  -FU AM Labs  -Rest, ice, compress and elevate the extremity as we needed  -Incentive Spirometry  -Medical management appreciated    Ortho [phone] Patient seen and examined at bedside. No acute events over night. Pt has required straight cath x4.  No acute complaints at this time. Pain well controlled. Denies chest pain, shortness of breath, nausea or vomiting.     PE:  Vital Signs Last 24 Hrs  T(C): 36.5 (11-23-19 @ 06:03), Max: 36.8 (11-22-19 @ 11:41)  T(F): 97.7 (11-23-19 @ 06:03), Max: 98.3 (11-22-19 @ 11:41)  HR: 79 (11-23-19 @ 06:03) (79 - 99)  BP: 117/63 (11-23-19 @ 06:03) (109/63 - 136/81)  BP(mean): --  RR: 18 (11-23-19 @ 06:03) (18 - 18)  SpO2: 97% (11-23-19 @ 06:03) (93% - 97%)    General: NAD, resting comforatbly in bed  LUE:   Dressing C/D/I  Compartments soft and compressible  No calf tenderness bilaterally  +Axillary/Musculocutaneous/Radial/Median/AIN/PIN intact  SILT C5-T1  2+ radial pulses                A/P:  54y m s/p L SS  -PT/OT - NWB on the L shoulder  -Pain Control  -DVT ppx w/heparin  -FU AM Labs  -Rest, ice, compress and elevate the extremity as we needed  -Incentive Spirometry  -Medical management appreciated    Ortho [phone]

## 2019-11-24 ENCOUNTER — TRANSCRIPTION ENCOUNTER (OUTPATIENT)
Age: 54
End: 2019-11-24

## 2019-11-24 RX ADMIN — OXYCODONE HYDROCHLORIDE 10 MILLIGRAM(S): 5 TABLET ORAL at 05:19

## 2019-11-24 RX ADMIN — OXYCODONE HYDROCHLORIDE 10 MILLIGRAM(S): 5 TABLET ORAL at 18:27

## 2019-11-24 RX ADMIN — Medication 10 MILLIGRAM(S): at 23:51

## 2019-11-24 RX ADMIN — OXYCODONE HYDROCHLORIDE 10 MILLIGRAM(S): 5 TABLET ORAL at 01:15

## 2019-11-24 RX ADMIN — Medication 5 MILLIGRAM(S): at 22:39

## 2019-11-24 RX ADMIN — OXYCODONE HYDROCHLORIDE 10 MILLIGRAM(S): 5 TABLET ORAL at 01:45

## 2019-11-24 RX ADMIN — OXYCODONE HYDROCHLORIDE 10 MILLIGRAM(S): 5 TABLET ORAL at 18:48

## 2019-11-24 RX ADMIN — OXYCODONE HYDROCHLORIDE 10 MILLIGRAM(S): 5 TABLET ORAL at 23:10

## 2019-11-24 RX ADMIN — OXYCODONE HYDROCHLORIDE 10 MILLIGRAM(S): 5 TABLET ORAL at 12:58

## 2019-11-24 RX ADMIN — OXYCODONE HYDROCHLORIDE 10 MILLIGRAM(S): 5 TABLET ORAL at 22:40

## 2019-11-24 RX ADMIN — OXYCODONE HYDROCHLORIDE 10 MILLIGRAM(S): 5 TABLET ORAL at 05:49

## 2019-11-24 RX ADMIN — Medication 20 MILLIGRAM(S): at 22:39

## 2019-11-24 RX ADMIN — OXYCODONE HYDROCHLORIDE 10 MILLIGRAM(S): 5 TABLET ORAL at 13:28

## 2019-11-24 RX ADMIN — Medication 325 MILLIGRAM(S): at 12:57

## 2019-11-24 RX ADMIN — TAMSULOSIN HYDROCHLORIDE 0.4 MILLIGRAM(S): 0.4 CAPSULE ORAL at 22:39

## 2019-11-24 NOTE — PROGRESS NOTE ADULT - SUBJECTIVE AND OBJECTIVE BOX
Pt S/E at bedside, no acute events overnight, pain controlled. No complaints this morning. Pt has been able to urinate without need for straight cath. Has not had a bowel movement. Dressing changed this morning.    Vital Signs Last 24 Hrs  T(C): 36.5 (24 Nov 2019 05:12), Max: 36.5 (23 Nov 2019 11:01)  T(F): 97.7 (24 Nov 2019 05:12), Max: 97.7 (23 Nov 2019 11:01)  HR: 83 (24 Nov 2019 05:12) (76 - 85)  BP: 115/71 (24 Nov 2019 05:12) (103/67 - 123/69)  BP(mean): --  RR: 16 (24 Nov 2019 05:12) (16 - 16)  SpO2: 94% (24 Nov 2019 05:12) (94% - 96%)    Gen: NAD, AAOx3    Left Upper Extremity:  Incisions c/d/i, steri strips intact  +AIN/PIN/M/R/U/Msc/Ax  SILT C5-T1  +Radial Pulse  Compartments soft  No calf TTP B/L     Assessment and Plan:   · Assessment		  54M s/p L shoulder arthroscopy, ZACKARY, HERLINDA, and extensive debridement, POD 4  Pain control  WBAT LUE  DVT ppx with aspirin  Ice as needed for L shoulder  Encourage PT to L shoulder  Incentive spirometry  Dispo planning- Rehab

## 2019-11-24 NOTE — PROGRESS NOTE ADULT - SUBJECTIVE AND OBJECTIVE BOX
Pt S/E at bedside, no acute events overnight, pain controlled. No complaints this morning. Pt has been able to urinate without need for straight cath. Has not had a bowel movement. Dressing changed this AM.    Vital Signs Last 24 Hrs  T(C): 36.5 (24 Nov 2019 05:12), Max: 36.5 (23 Nov 2019 11:01)  T(F): 97.7 (24 Nov 2019 05:12), Max: 97.7 (23 Nov 2019 11:01)  HR: 83 (24 Nov 2019 05:12) (76 - 85)  BP: 115/71 (24 Nov 2019 05:12) (103/67 - 123/69)  BP(mean): --  RR: 16 (24 Nov 2019 05:12) (16 - 16)  SpO2: 94% (24 Nov 2019 05:12) (94% - 96%)    Gen: NAD, AAOx3    Left Upper Extremity:  Incisions c/d/i, steri strips intact  +AIN/PIN/M/R/U/Msc/Ax  SILT C5-T1  +Radial Pulse  Compartments soft  No calf TTP B/L

## 2019-11-24 NOTE — PROGRESS NOTE ADULT - ASSESSMENT
54M s/p L shoulder arthroscopy POD 4  Pain control  WBAT LUE  DVT ppx with aspirin  Ice as needed for L shoulder  Encourage PT to L shoulder  Incentive spirometry  Dispo planning

## 2019-11-24 NOTE — DISCHARGE NOTE NURSING/CASE MANAGEMENT/SOCIAL WORK - PATIENT PORTAL LINK FT
You can access the FollowMyHealth Patient Portal offered by [facility] by registering at the following website: [email or web address]. By joining [employer]’s FollowMyHealth portal, you will also be able to view your health information using other applications (apps) compatible with our system.

## 2019-11-25 VITALS
SYSTOLIC BLOOD PRESSURE: 122 MMHG | OXYGEN SATURATION: 94 % | TEMPERATURE: 97 F | HEART RATE: 98 BPM | RESPIRATION RATE: 18 BRPM | DIASTOLIC BLOOD PRESSURE: 81 MMHG

## 2019-11-25 RX ADMIN — Medication 325 MILLIGRAM(S): at 11:15

## 2019-11-25 RX ADMIN — ZOLPIDEM TARTRATE 5 MILLIGRAM(S): 10 TABLET ORAL at 00:40

## 2019-11-25 RX ADMIN — OXYCODONE HYDROCHLORIDE 10 MILLIGRAM(S): 5 TABLET ORAL at 12:23

## 2019-11-25 RX ADMIN — OXYCODONE HYDROCHLORIDE 10 MILLIGRAM(S): 5 TABLET ORAL at 04:11

## 2019-11-25 RX ADMIN — OXYCODONE HYDROCHLORIDE 10 MILLIGRAM(S): 5 TABLET ORAL at 12:53

## 2019-11-25 RX ADMIN — OXYCODONE HYDROCHLORIDE 10 MILLIGRAM(S): 5 TABLET ORAL at 04:41

## 2019-11-25 NOTE — PROGRESS NOTE ADULT - REASON FOR ADMISSION
left shoulder pain and stiffness

## 2019-11-25 NOTE — PROGRESS NOTE ADULT - ASSESSMENT
54M s/p L shoulder arthroscopy POD 5  Pain control  WBAT LUE  DVT ppx with aspirin  Ice as needed for L shoulder  Encourage PT to L shoulder  Incentive spirometry  Dispo planning- home today

## 2019-11-25 NOTE — PROGRESS NOTE ADULT - SUBJECTIVE AND OBJECTIVE BOX
Pt S/E at bedside, no acute events overnight, pain controlled. No complaints this morning. Pt has been able to urinate without need for straight cath.     Vital Signs Last 24 Hrs  T(C): 36.6 (25 Nov 2019 05:10), Max: 36.7 (24 Nov 2019 10:50)  T(F): 97.8 (25 Nov 2019 05:10), Max: 98 (24 Nov 2019 10:50)  HR: 91 (25 Nov 2019 05:10) (73 - 91)  BP: 109/79 (25 Nov 2019 05:10) (100/60 - 113/71)  BP(mean): --  RR: 18 (25 Nov 2019 05:10) (18 - 18)  SpO2: 94% (25 Nov 2019 05:10) (94% - 95%)    Gen: NAD, AAOx3    Left Upper Extremity:  Incisions c/d/i, steri strips intact  +AIN/PIN/M/R/U/Msc/Ax  SILT C5-T1  +Radial Pulse  Compartments soft  No calf TTP B/L

## 2019-12-02 DIAGNOSIS — G83.81 BROWN-SEQUARD SYNDROME: ICD-10-CM

## 2019-12-02 DIAGNOSIS — Z99.3 DEPENDENCE ON WHEELCHAIR: ICD-10-CM

## 2019-12-02 DIAGNOSIS — M67.88 OTHER SPECIFIED DISORDERS OF SYNOVIUM AND TENDON, OTHER SITE: ICD-10-CM

## 2019-12-02 DIAGNOSIS — G81.91 HEMIPLEGIA, UNSPECIFIED AFFECTING RIGHT DOMINANT SIDE: ICD-10-CM

## 2019-12-02 DIAGNOSIS — M75.02 ADHESIVE CAPSULITIS OF LEFT SHOULDER: ICD-10-CM

## 2019-12-02 DIAGNOSIS — G82.20 PARAPLEGIA, UNSPECIFIED: ICD-10-CM

## 2019-12-02 DIAGNOSIS — R33.9 RETENTION OF URINE, UNSPECIFIED: ICD-10-CM

## 2019-12-02 DIAGNOSIS — M24.812 OTHER SPECIFIC JOINT DERANGEMENTS OF LEFT SHOULDER, NOT ELSEWHERE CLASSIFIED: ICD-10-CM

## 2019-12-02 DIAGNOSIS — Z87.891 PERSONAL HISTORY OF NICOTINE DEPENDENCE: ICD-10-CM

## 2019-12-06 ENCOUNTER — MESSAGE (OUTPATIENT)
Age: 54
End: 2019-12-06

## 2019-12-10 ENCOUNTER — APPOINTMENT (OUTPATIENT)
Dept: ORTHOPEDIC SURGERY | Facility: CLINIC | Age: 54
End: 2019-12-10

## 2019-12-12 PROBLEM — R20.2 PARESTHESIA OF SKIN: Chronic | Status: ACTIVE | Noted: 2019-11-13

## 2019-12-12 PROBLEM — R26.9 UNSPECIFIED ABNORMALITIES OF GAIT AND MOBILITY: Chronic | Status: ACTIVE | Noted: 2019-11-13

## 2019-12-12 PROBLEM — G83.81 BROWN-SEQUARD SYNDROME: Chronic | Status: ACTIVE | Noted: 2019-11-13

## 2019-12-12 PROBLEM — M25.512 PAIN IN LEFT SHOULDER: Chronic | Status: ACTIVE | Noted: 2019-11-13

## 2019-12-12 PROBLEM — M75.101 UNSPECIFIED ROTATOR CUFF TEAR OR RUPTURE OF RIGHT SHOULDER, NOT SPECIFIED AS TRAUMATIC: Chronic | Status: ACTIVE | Noted: 2019-11-13

## 2019-12-17 ENCOUNTER — APPOINTMENT (OUTPATIENT)
Dept: ORTHOPEDIC SURGERY | Facility: CLINIC | Age: 54
End: 2019-12-17
Payer: MEDICARE

## 2019-12-17 VITALS — WEIGHT: 190 LBS | HEIGHT: 73 IN | TEMPERATURE: 97 F | BODY MASS INDEX: 25.18 KG/M2

## 2019-12-17 PROCEDURE — 99024 POSTOP FOLLOW-UP VISIT: CPT

## 2019-12-17 PROCEDURE — 73030 X-RAY EXAM OF SHOULDER: CPT | Mod: TC,LT

## 2019-12-17 NOTE — HISTORY OF PRESENT ILLNESS
[___ Weeks Post Op] : [unfilled] weeks post op [0] : no pain reported [Clean/Dry/Intact] : clean, dry and intact [Healed] : healed [Neuro Intact] : an unremarkable neurological exam [Vascular Intact] : ~T peripheral vascular exam normal [Xray (Date:___)] : [unfilled] Xray -  [Chills] : no chills [Fever] : no fever [Nausea] : no nausea [Vomiting] : no vomiting [Erythema] : not erythematous [Discharge] : absent of discharge [Swelling] : not swollen [Dehiscence] : not dehisced [de-identified] : 4 weeks s/p left shoulder arthroscopy. DOS: 11/20/2019. [de-identified] : ANI is a 54 year old male who presents today for his first post operative visit s/p left shoulder glenoid labrum debridement, SAD with acromioplasty, and HERLINDA.  He takes "narco" for pain BID.  He sustained a fall, was seen at the ED and subsequently is in [facility].\par  [de-identified] : Incisions are clean, dry and intact. No surrounding erythema. No drainage. Wound appears to be healing well. Unable  to assess  ROM due to recent surgical procedure. Motor and sensation are intact distally. He has full range of motion of all fingers with capillary refill of <2 seconds throughout. He has good nerve function and median nerve, ulnar, radial, PIN, AIN. He has no sensory deficits over the C5-T1 nerve roots. Radial pulses 2+. [de-identified] : 2 view xrays of pts left shoulder were performed today and available for me to review. They demonstrate adequate position of suture buttons. No fracture. No dislocation. No other deformities. [de-identified] : ANI is a 54 year male who is doing well and is without complaints. He presents today in his sling with pillow. His stitches and Steri-Strips were removed. Pt tolerated well.  Surgical sites are clean and dry without warmth or erythema, pt denies fever or chills.  He is to remain in the sling until we see him again at his next post-op destiny and continue with being fully nonweightbearing to this extremity. He will start PT at this time 2-3x/week alongside HEP until we see them again in 4 weeks for clinical reassessment. He may use tylenol prn for pain. He agrees to the latter plan and does not have any further questions or concerns.

## 2020-01-09 ENCOUNTER — APPOINTMENT (OUTPATIENT)
Dept: ORTHOPEDIC SURGERY | Facility: CLINIC | Age: 55
End: 2020-01-09
Payer: MEDICARE

## 2020-01-09 VITALS
SYSTOLIC BLOOD PRESSURE: 139 MMHG | HEART RATE: 103 BPM | BODY MASS INDEX: 25.18 KG/M2 | DIASTOLIC BLOOD PRESSURE: 76 MMHG | WEIGHT: 190 LBS | HEIGHT: 73 IN

## 2020-01-09 DIAGNOSIS — S46.219A STRAIN OF MUSCLE, FASCIA AND TENDON OF OTHER PARTS OF BICEPS, UNSPECIFIED ARM, INITIAL ENCOUNTER: ICD-10-CM

## 2020-01-09 PROCEDURE — 99024 POSTOP FOLLOW-UP VISIT: CPT

## 2020-01-28 ENCOUNTER — APPOINTMENT (OUTPATIENT)
Dept: ORTHOPEDIC SURGERY | Facility: CLINIC | Age: 55
End: 2020-01-28

## 2020-02-26 ENCOUNTER — APPOINTMENT (OUTPATIENT)
Dept: ORTHOPEDIC SURGERY | Facility: CLINIC | Age: 55
End: 2020-02-26
Payer: MEDICARE

## 2020-02-26 VITALS
SYSTOLIC BLOOD PRESSURE: 111 MMHG | DIASTOLIC BLOOD PRESSURE: 77 MMHG | BODY MASS INDEX: 35.87 KG/M2 | HEART RATE: 89 BPM | HEIGHT: 61 IN | WEIGHT: 190 LBS

## 2020-02-26 DIAGNOSIS — M19.012 PRIMARY OSTEOARTHRITIS, LEFT SHOULDER: ICD-10-CM

## 2020-02-26 PROCEDURE — 99213 OFFICE O/P EST LOW 20 MIN: CPT

## 2020-05-01 ENCOUNTER — APPOINTMENT (OUTPATIENT)
Dept: ORTHOPEDIC SURGERY | Facility: CLINIC | Age: 55
End: 2020-05-01

## 2020-05-04 PROBLEM — M75.02 ADHESIVE CAPSULITIS OF LEFT SHOULDER: Status: ACTIVE | Noted: 2019-10-18

## 2020-05-05 ENCOUNTER — APPOINTMENT (OUTPATIENT)
Dept: ORTHOPEDIC SURGERY | Facility: CLINIC | Age: 55
End: 2020-05-05
Payer: MEDICARE

## 2020-05-05 DIAGNOSIS — M75.02 ADHESIVE CAPSULITIS OF LEFT SHOULDER: ICD-10-CM

## 2020-05-05 PROCEDURE — 99214 OFFICE O/P EST MOD 30 MIN: CPT | Mod: 95

## 2020-05-05 RX ORDER — METHYLPREDNISOLONE 4 MG/1
4 TABLET ORAL
Qty: 1 | Refills: 0 | Status: ACTIVE | COMMUNITY
Start: 2020-05-05 | End: 1900-01-01

## 2020-05-05 NOTE — PHYSICAL EXAM
[de-identified] : Physical Exam:\par General: Well appearing, no acute distress, A&O\par Neurologic: A&Ox3, No focal deficits\par Head: NCAT without abrasions, lacerations, or ecchymosis to head, face, or scalp\par Eyes: No scleral icterus, no gross abnormalities\par Respiratory: Equal chest wall expansion bilaterally, no accessory muscle use\par Skin: no erythema\par Psychiatric: Normal mood and affect\par \par Left upper extremity: Active motion of shoulder: Forward flexion 100 degrees, external rotation 15 degrees, internal rotation of belly.  Passive using his other arm he is able to forward flex to 110 degrees external rotate to 20 degrees roughly an inch rotation again to belt.  He is unable to make a complete fist which he says has been worsening over the past couple weeks.  He is able to fully extend fingers cross his fingers flex and extend his wrist but has diminished flexion.  He is able to flex and extend his elbow.  He complains of subjective weakness in abduction forward flexion external rotation and internal rotation with isometric holds against the wall.  This is compared to his right arm.  That is unchanged since last visit.

## 2020-05-05 NOTE — HISTORY OF PRESENT ILLNESS
[Home] : at home, [unfilled] , at the time of the visit. [Other Location: e.g. 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  He is currently stuck at his girlfriend's house in [place].  He has been there for the past 2 and half months.  He states he is noticing increased stiffness in both the shoulder and his hand.  He has noticed worsening stiffness in his hand versus the shoulder.  He had 2 sessions of physical therapy and then it was closed due to the coronavirus.  He is tried home exercise program which is given him only mild relief.  He denies significant pain in the shoulder but does complain of a fair amount of stiffness.  He states he feels as if his shoulder is stiffening worse than our last visit.

## 2020-05-05 NOTE — REVIEW OF SYSTEMS
[Joint Pain] : joint pain [Joint Stiffness] : joint stiffness [Joint Swelling] : joint swelling [Negative] : Heme/Lymph [FreeTextEntry9] : left shoulder

## 2020-05-05 NOTE — DISCUSSION/SUMMARY
[de-identified] : Bong is a 55-year-old male status post left shoulder arthroscopy for posttraumatic adhesive capsulitis.  He was improving over the last visit although at this time he is starting to regress likely secondary to the lack of physical therapy due to the coronavirus.  At this time I recommend continued physical therapy.  The physical therapy office has opened up this week and he will return.  In order to help improve his range of motion I also recommend a Medrol Dosepak.  This will relieve stiffness in the shoulder while he is doing physical therapy.  I recommend recommend he sees Dr. Hooper once he returns from [place].  I would like to see him back at the end of June early July as well.  He will get a new physical therapy prescription once he returns to [place] hopefully within the next 2 weeks.  He agrees with the above plan and all questions were answered.

## 2020-07-15 ENCOUNTER — APPOINTMENT (OUTPATIENT)
Dept: ORTHOPEDIC SURGERY | Facility: CLINIC | Age: 55
End: 2020-07-15
Payer: MEDICARE

## 2020-07-15 VITALS
TEMPERATURE: 97.9 F | DIASTOLIC BLOOD PRESSURE: 75 MMHG | HEIGHT: 61 IN | SYSTOLIC BLOOD PRESSURE: 116 MMHG | BODY MASS INDEX: 35.87 KG/M2 | HEART RATE: 72 BPM | WEIGHT: 190 LBS

## 2020-07-15 DIAGNOSIS — S46.811A STRAIN OF OTHER MUSCLES, FASCIA AND TENDONS AT SHOULDER AND UPPER ARM LEVEL, RIGHT ARM, INITIAL ENCOUNTER: ICD-10-CM

## 2020-07-15 DIAGNOSIS — S43.432A SUPERIOR GLENOID LABRUM LESION OF LEFT SHOULDER, INITIAL ENCOUNTER: ICD-10-CM

## 2020-07-15 DIAGNOSIS — M67.912 UNSPECIFIED DISORDER OF SYNOVIUM AND TENDON, LEFT SHOULDER: ICD-10-CM

## 2020-07-15 DIAGNOSIS — Z98.890 OTHER SPECIFIED POSTPROCEDURAL STATES: ICD-10-CM

## 2020-07-15 PROCEDURE — 99214 OFFICE O/P EST MOD 30 MIN: CPT

## 2022-12-20 NOTE — H&P PST ADULT - NEGATIVE CARDIOVASCULAR SYMPTOMS
no palpitations/no dyspnea on exertion/no peripheral edema/no chest pain A-T Advancement Flap Text: The defect edges were debeveled with a #15 scalpel blade.  Given the location of the defect, shape of the defect and the proximity to free margins an A-T advancement flap was deemed most appropriate.  Using a sterile surgical marker, an appropriate advancement flap was drawn incorporating the defect and placing the expected incisions within the relaxed skin tension lines where possible.    The area thus outlined was incised deep to adipose tissue with a #15 scalpel blade.  The skin margins were undermined to an appropriate distance in all directions utilizing iris scissors.

## 2023-03-13 NOTE — H&P PST ADULT - NS PRO REGISTERED ORGAN DONOR
Has The Cancer Been Biopsied Before?: has been previously biopsied
Who Is Your Referring Provider?: Dr Roosevelt faust
No

## 2024-04-16 NOTE — PATIENT PROFILE ADULT - NSPROMUTPARTICIPCAREFT_GEN_A_NUR
[email or web address]    Office hours are 8:00 am to 5:00 pm Monday through Friday. If it is urgent that you speak with someone outside of these hours, our [facility] Call Center will be able to assist you.   If you have any questions or concerns prior to your next office visit, please call our Pain Line: [phone].     Locations:  [facility]  [address] [place] [address]    [facility]  [address] [address]    [facility]  [address] [place] [address]    Thank you for choosing [facility]!         
n/a

## 2024-07-01 NOTE — ASU PREOP CHECKLIST - RESPIRATORY RATE (BREATHS/MIN)
16 General Sunscreen Counseling: I recommended a broad spectrum sunscreen with a SPF of 30 or higher.  I explained that SPF 30 sunscreens block approximately 97 percent of the sun's harmful rays.  Sunscreens should be applied at least 15 minutes prior to expected sun exposure and then every 2 hours after that as long as sun exposure continues. If swimming or exercising sunscreen should be reapplied every 45 minutes to an hour after getting wet or sweating.  One ounce, or the equivalent of a shot glass full of sunscreen, is adequate to protect the skin not covered by a bathing suit. I also recommended a lip balm with a sunscreen as well. Sun protective clothing can be used in lieu of sunscreen but must be worn the entire time you are exposed to the sun's rays. Detail Level: Detailed
